# Patient Record
Sex: FEMALE | Race: WHITE | NOT HISPANIC OR LATINO | URBAN - METROPOLITAN AREA
[De-identification: names, ages, dates, MRNs, and addresses within clinical notes are randomized per-mention and may not be internally consistent; named-entity substitution may affect disease eponyms.]

---

## 2023-07-25 ENCOUNTER — TELEPHONE (OUTPATIENT)
Dept: PRIMARY CARE | Facility: CLINIC | Age: 18
End: 2023-07-25

## 2024-02-07 ENCOUNTER — LAB REQUISITION (OUTPATIENT)
Dept: LAB | Facility: HOSPITAL | Age: 19
End: 2024-02-07

## 2024-02-07 DIAGNOSIS — R30.0 DYSURIA: ICD-10-CM

## 2024-02-07 DIAGNOSIS — N89.8 OTHER SPECIFIED NONINFLAMMATORY DISORDERS OF VAGINA: ICD-10-CM

## 2024-02-07 PROCEDURE — 87102 FUNGUS ISOLATION CULTURE: CPT

## 2024-02-07 PROCEDURE — 87086 URINE CULTURE/COLONY COUNT: CPT

## 2024-02-07 PROCEDURE — 87800 DETECT AGNT MULT DNA DIREC: CPT

## 2024-02-08 ENCOUNTER — LAB REQUISITION (OUTPATIENT)
Dept: LAB | Facility: HOSPITAL | Age: 19
End: 2024-02-08

## 2024-02-08 DIAGNOSIS — N89.8 OTHER SPECIFIED NONINFLAMMATORY DISORDERS OF VAGINA: ICD-10-CM

## 2024-02-08 DIAGNOSIS — R30.0 DYSURIA: ICD-10-CM

## 2024-02-08 LAB
C TRACH RRNA SPEC QL NAA+PROBE: NEGATIVE
N GONORRHOEA DNA SPEC QL PROBE+SIG AMP: NEGATIVE

## 2024-02-09 LAB — BACTERIA UR CULT: NORMAL

## 2024-02-11 LAB — YEAST SPEC QL CULT: NORMAL

## 2025-01-13 ENCOUNTER — APPOINTMENT (OUTPATIENT)
Dept: URGENT CARE | Age: 20
End: 2025-01-13
Payer: COMMERCIAL

## 2025-01-13 ENCOUNTER — OFFICE VISIT (OUTPATIENT)
Dept: URGENT CARE | Age: 20
End: 2025-01-13
Payer: COMMERCIAL

## 2025-01-13 VITALS
WEIGHT: 110 LBS | HEART RATE: 80 BPM | HEIGHT: 64 IN | SYSTOLIC BLOOD PRESSURE: 120 MMHG | BODY MASS INDEX: 18.78 KG/M2 | DIASTOLIC BLOOD PRESSURE: 81 MMHG | OXYGEN SATURATION: 96 %

## 2025-01-13 DIAGNOSIS — N30.01 ACUTE CYSTITIS WITH HEMATURIA: Primary | ICD-10-CM

## 2025-01-13 LAB
POC APPEARANCE, URINE: CLEAR
POC BILIRUBIN, URINE: ABNORMAL
POC BLOOD, URINE: NEGATIVE
POC COLOR, URINE: ABNORMAL
POC GLUCOSE, URINE: ABNORMAL MG/DL
POC KETONES, URINE: ABNORMAL MG/DL
POC LEUKOCYTES, URINE: ABNORMAL
POC NITRITE,URINE: POSITIVE
POC PH, URINE: 7 PH
POC PROTEIN, URINE: NEGATIVE MG/DL
POC SPECIFIC GRAVITY, URINE: 1.01
POC UROBILINOGEN, URINE: 2 EU/DL
PREGNANCY TEST URINE, POC: NEGATIVE

## 2025-01-13 PROCEDURE — 3008F BODY MASS INDEX DOCD: CPT

## 2025-01-13 PROCEDURE — 87086 URINE CULTURE/COLONY COUNT: CPT

## 2025-01-13 PROCEDURE — 81003 URINALYSIS AUTO W/O SCOPE: CPT

## 2025-01-13 PROCEDURE — 1036F TOBACCO NON-USER: CPT

## 2025-01-13 PROCEDURE — 99203 OFFICE O/P NEW LOW 30 MIN: CPT

## 2025-01-13 PROCEDURE — 81025 URINE PREGNANCY TEST: CPT

## 2025-01-13 RX ORDER — PHENAZOPYRIDINE HYDROCHLORIDE 200 MG/1
200 TABLET, FILM COATED ORAL 3 TIMES DAILY PRN
Qty: 10 TABLET | Refills: 0 | Status: SHIPPED | OUTPATIENT
Start: 2025-01-13 | End: 2025-01-16

## 2025-01-13 RX ORDER — NITROFURANTOIN 25; 75 MG/1; MG/1
100 CAPSULE ORAL 2 TIMES DAILY
Qty: 10 CAPSULE | Refills: 0 | Status: SHIPPED | OUTPATIENT
Start: 2025-01-13 | End: 2025-01-18

## 2025-01-13 RX ORDER — NITROFURANTOIN 25; 75 MG/1; MG/1
100 CAPSULE ORAL 2 TIMES DAILY
Qty: 10 CAPSULE | Refills: 0 | Status: SHIPPED | OUTPATIENT
Start: 2025-01-13 | End: 2025-01-13

## 2025-01-13 RX ORDER — PHENAZOPYRIDINE HYDROCHLORIDE 200 MG/1
200 TABLET, FILM COATED ORAL 3 TIMES DAILY PRN
Qty: 10 TABLET | Refills: 0 | Status: SHIPPED | OUTPATIENT
Start: 2025-01-13 | End: 2025-01-13

## 2025-01-13 ASSESSMENT — ENCOUNTER SYMPTOMS
FREQUENCY: 1
DYSURIA: 1

## 2025-01-13 NOTE — PATIENT INSTRUCTIONS
You were seen at Urgent Care today and diagnosed with a urinary tract infection.  Please treat as discussed. Please take medications as prescribed. Monitor for red flags which we spoke about, If your symptoms change, worsen or become concerning in any way, please go to the emergency room immediately, otherwise you can followup with your PCP in 2-3 days as needed

## 2025-01-13 NOTE — PROGRESS NOTES
Subjective   Patient ID: Marlene De Jesus is a 19 y.o. female. They present today with a chief complaint of UTI (Frequent urination with fullness symptoms for 5 days).    History of Present Illness  Patient is a pleasant 19-year-old female with no relevant past medical history presents urgent care today with a complaint of urinary symptoms.  She states her symptoms are approximately 5 days ago.  Specifically she endorses urinary urgency/frequency/burning.  She has been using over-the-counter medication without significant relief.  Denies any abdominal pain, fevers, chills, nausea, vomiting or back pain.  No other complaints or concerns this time and      History provided by:  Patient  UTI      Past Medical History  Allergies as of 01/13/2025    (No Known Allergies)       (Not in a hospital admission)         Past Medical History:   Diagnosis Date    Body mass index (BMI) 20.0-20.9, adult 09/30/2021    BMI 20.0-20.9, adult    Body mass index (BMI) 20.0-20.9, adult 09/16/2021    BMI 20.0-20.9, adult    Body mass index (BMI) pediatric, 5th percentile to less than 85th percentile for age 01/14/2021    BMI (body mass index), pediatric, 5% to less than 85% for age    Cellulitis of face 10/18/2021    Cellulitis of cheek    Open bite of right upper arm, subsequent encounter 11/04/2021    Dog bite of right upper extremity, subsequent encounter    Other conditions influencing health status 01/21/2022    History of cough    Pelvic and perineal pain 07/15/2021    Vaginal pain    Personal history of other diseases of the female genital tract     History of vaginal discharge    Personal history of other diseases of the female genital tract     History of vaginitis    Rash and other nonspecific skin eruption 09/08/2021    Rash    Unspecified inflammation of eyelid 12/05/2021    Infected eye lid       No past surgical history on file.     reports that she has never smoked. She has never been exposed to tobacco smoke. She  "has never used smokeless tobacco. She reports current drug use. Drug: Marijuana. She reports that she does not drink alcohol.    Review of Systems  Review of Systems   Genitourinary:  Positive for dysuria, frequency and urgency.                                  Objective    Vitals:    01/13/25 1343   BP: 120/81   BP Location: Left arm   Patient Position: Sitting   BP Cuff Size: Adult   Pulse: 80   SpO2: 96%   Weight: 49.9 kg (110 lb)   Height: 1.626 m (5' 4\")     Patient's last menstrual period was 12/18/2024 (approximate).    Physical Exam  Vitals and nursing note reviewed.   Constitutional:       General: She is not in acute distress.     Appearance: Normal appearance. She is not ill-appearing, toxic-appearing or diaphoretic.   HENT:      Head: Normocephalic and atraumatic.      Mouth/Throat:      Mouth: Mucous membranes are moist.   Eyes:      Extraocular Movements: Extraocular movements intact.      Conjunctiva/sclera: Conjunctivae normal.      Pupils: Pupils are equal, round, and reactive to light.   Cardiovascular:      Rate and Rhythm: Normal rate and regular rhythm.      Pulses: Normal pulses.      Heart sounds: Normal heart sounds.   Pulmonary:      Effort: Pulmonary effort is normal. No respiratory distress.      Breath sounds: Normal breath sounds. No stridor. No wheezing, rhonchi or rales.   Chest:      Chest wall: No tenderness.   Abdominal:      General: Abdomen is flat.      Palpations: Abdomen is soft.      Tenderness: There is no right CVA tenderness or left CVA tenderness.   Musculoskeletal:         General: Normal range of motion.      Cervical back: Normal range of motion and neck supple.   Skin:     General: Skin is warm and dry.      Capillary Refill: Capillary refill takes less than 2 seconds.   Neurological:      General: No focal deficit present.      Mental Status: She is alert and oriented to person, place, and time.   Psychiatric:         Mood and Affect: Mood normal.         Behavior: " Behavior normal.         Procedures      Assessment/Plan   Allergies, medications, history, and pertinent labs/EKGs/Imaging reviewed by ALISSA Bray.     Medical Decision Making  Patient is well appearing, afebrile, non toxic, not hypoxic, and appropriate for outpatient treatment and management at time of evaluation.     Patient presents with urinary symptoms as described above.     Differential includes but not limited to: Urinary tract infection, pyelonephritis, nephrolithiasis, other.      Pregnancy is negative.  Urinalysis positive for signs of infection and hematuria. Antibiotics   Called into patient's pharmacy to be used as directed.  Discussed differential with the patient. Patient voices understanding and is agreeable to close follow-up with their PCP in the next 2-3 days.  Patient states she does not currently have a PCP.  Order was placed on her behalf.  They understand they should go to the emergency room immediately for any new, worsening or concerning symptoms. Patient understands return precautions and discharge instructions.        Orders and Diagnoses  Diagnoses and all orders for this visit:  Acute cystitis with hematuria  -     Urine Culture  -     nitrofurantoin, macrocrystal-monohydrate, (Macrobid) 100 mg capsule; Take 1 capsule (100 mg) by mouth 2 times a day for 5 days.  -     phenazopyridine (Pyridium) 200 mg tablet; Take 1 tablet (200 mg) by mouth 3 times a day as needed for bladder spasms for up to 3 days.  -     Referral to Primary Care; Future      Medical Admin Record      Follow Up Instructions  No follow-ups on file.    Patient disposition: Home    Electronically signed by ALISSA Bray  2:01 PM

## 2025-01-14 LAB — BACTERIA UR CULT: NO GROWTH

## 2025-01-28 ENCOUNTER — OFFICE VISIT (OUTPATIENT)
Dept: URGENT CARE | Age: 20
End: 2025-01-28
Payer: COMMERCIAL

## 2025-01-28 VITALS
BODY MASS INDEX: 18.88 KG/M2 | TEMPERATURE: 98.2 F | OXYGEN SATURATION: 95 % | WEIGHT: 110 LBS | SYSTOLIC BLOOD PRESSURE: 108 MMHG | DIASTOLIC BLOOD PRESSURE: 71 MMHG | HEART RATE: 97 BPM | RESPIRATION RATE: 16 BRPM

## 2025-01-28 DIAGNOSIS — B00.9 HSV (HERPES SIMPLEX VIRUS) INFECTION: ICD-10-CM

## 2025-01-28 DIAGNOSIS — Z76.89 ESTABLISHING CARE WITH NEW DOCTOR, ENCOUNTER FOR: Primary | ICD-10-CM

## 2025-01-28 DIAGNOSIS — B37.31 ACUTE CANDIDIASIS OF VULVA AND VAGINA: ICD-10-CM

## 2025-01-28 DIAGNOSIS — R30.0 DYSURIA: ICD-10-CM

## 2025-01-28 DIAGNOSIS — N89.8 VAGINAL ITCHING: ICD-10-CM

## 2025-01-28 DIAGNOSIS — Z87.440 HISTORY OF UTI: ICD-10-CM

## 2025-01-28 LAB
POC APPEARANCE, URINE: ABNORMAL
POC BACTERIAL VAGINITIS (RAPID): NEGATIVE
POC BILIRUBIN, URINE: NEGATIVE
POC BLOOD, URINE: ABNORMAL
POC COLOR, URINE: YELLOW
POC GLUCOSE, URINE: NEGATIVE MG/DL
POC KETONES, URINE: NEGATIVE MG/DL
POC LEUKOCYTES, URINE: NEGATIVE
POC NITRITE,URINE: NEGATIVE
POC PH, URINE: 7 PH
POC PROTEIN, URINE: NEGATIVE MG/DL
POC SPECIFIC GRAVITY, URINE: 1.01
POC UROBILINOGEN, URINE: 0.2 EU/DL
PREGNANCY TEST URINE, POC: NEGATIVE

## 2025-01-28 RX ORDER — ACYCLOVIR 400 MG/1
400 TABLET ORAL 4 TIMES DAILY
Qty: 50 TABLET | Refills: 2 | Status: SHIPPED | OUTPATIENT
Start: 2025-01-28 | End: 2025-02-04

## 2025-01-28 RX ORDER — FLUCONAZOLE 150 MG/1
150 TABLET ORAL ONCE
Qty: 1 TABLET | Refills: 0 | Status: SHIPPED | OUTPATIENT
Start: 2025-01-28 | End: 2025-01-28

## 2025-01-28 NOTE — PROGRESS NOTES
Subjective   Patient ID: Marlene De Jesus is a 19 y.o. female. They present today with a chief complaint of Urinary Problem (Pt requesting bv yeast and uti testing - ongoing sx from past visit ).    History of Present Illness  HPI    Past Medical History  Allergies as of 01/28/2025    (No Known Allergies)       (Not in a hospital admission)       Past Medical History:   Diagnosis Date    Body mass index (BMI) 20.0-20.9, adult 09/30/2021    BMI 20.0-20.9, adult    Body mass index (BMI) 20.0-20.9, adult 09/16/2021    BMI 20.0-20.9, adult    Body mass index (BMI) pediatric, 5th percentile to less than 85th percentile for age 01/14/2021    BMI (body mass index), pediatric, 5% to less than 85% for age    Cellulitis of face 10/18/2021    Cellulitis of cheek    Open bite of right upper arm, subsequent encounter 11/04/2021    Dog bite of right upper extremity, subsequent encounter    Other conditions influencing health status 01/21/2022    History of cough    Pelvic and perineal pain 07/15/2021    Vaginal pain    Personal history of other diseases of the female genital tract     History of vaginal discharge    Personal history of other diseases of the female genital tract     History of vaginitis    Rash and other nonspecific skin eruption 09/08/2021    Rash    Unspecified inflammation of eyelid 12/05/2021    Infected eye lid       History reviewed. No pertinent surgical history.     reports that she has never smoked. She has never been exposed to tobacco smoke. She has never used smokeless tobacco. She reports current drug use. Drug: Marijuana. She reports that she does not drink alcohol.    Review of Systems  Review of Systems   Genitourinary:  Positive for urgency and vaginal discharge.        HSV outbreak per pt, needs refill   All other systems reviewed and are negative.                                 Objective    Vitals:    01/28/25 1538   BP: 108/71   Pulse: 97   Resp: 16   Temp: 36.8 °C (98.2 °F)   SpO2:  95%   Weight: 49.9 kg (110 lb)     Patient's last menstrual period was 01/28/2025 (approximate).    Physical Exam  Vitals and nursing note reviewed.   Constitutional:       Appearance: Normal appearance.   HENT:      Head: Normocephalic.      Right Ear: Tympanic membrane normal.      Left Ear: Tympanic membrane normal.      Nose: Nose normal.      Mouth/Throat:      Mouth: Mucous membranes are dry.      Pharynx: Oropharynx is clear.   Eyes:      Extraocular Movements: Extraocular movements intact.      Pupils: Pupils are equal, round, and reactive to light.   Cardiovascular:      Rate and Rhythm: Normal rate and regular rhythm.      Pulses: Normal pulses.      Heart sounds: Normal heart sounds.   Pulmonary:      Effort: Pulmonary effort is normal.      Breath sounds: Normal breath sounds.   Abdominal:      General: There is no distension.      Tenderness: There is no abdominal tenderness. There is no right CVA tenderness or left CVA tenderness.   Musculoskeletal:         General: Normal range of motion.      Cervical back: Normal range of motion and neck supple.   Lymphadenopathy:      Cervical: No cervical adenopathy.   Skin:     General: Skin is warm and dry.   Neurological:      General: No focal deficit present.      Mental Status: She is alert and oriented to person, place, and time.   Psychiatric:         Mood and Affect: Mood normal.         Behavior: Behavior normal.         Procedures    Point of Care Test & Imaging Results from this visit  Results for orders placed or performed in visit on 01/28/25   POCT BV Blue Rapid - Bacterial Vaginitis manually resulted   Result Value Ref Range    POC Bacterial Vaginitis (Rapid) Negative Negative   POCT UA Automated manually resulted   Result Value Ref Range    POC Color, Urine Yellow Straw, Yellow, Light-Yellow    POC Appearance, Urine Cloudy (A) Clear    POC Glucose, Urine NEGATIVE NEGATIVE mg/dl    POC Bilirubin, Urine NEGATIVE NEGATIVE    POC Ketones, Urine  NEGATIVE NEGATIVE mg/dl    POC Specific Gravity, Urine 1.015 1.005 - 1.035    POC Blood, Urine LARGE (3+) (A) NEGATIVE    POC PH, Urine 7.0 No Reference Range Established PH    POC Protein, Urine NEGATIVE NEGATIVE mg/dl    POC Urobilinogen, Urine 0.2 0.2, 1.0 EU/DL    Poc Nitrite, Urine NEGATIVE NEGATIVE    POC Leukocytes, Urine NEGATIVE NEGATIVE   POCT pregnancy, urine manually resulted   Result Value Ref Range    Preg Test, Ur Negative Negative      No results found.    Diagnostic study results (if any) were reviewed by ALISSA Pete.    Assessment/Plan   Allergies, medications, history, and pertinent labs/EKGs/Imaging reviewed by ALISSA Pete.     Medical Decision Making    Patient is a pleasant 19-year-old female with no relevant past medical history presents urgent care today with a complaint of urinary symptoms. Pt was treated here for UTI with s/s of urinary urgency/frequency/burning with macrobid. Pt's urine culture was negative last visit, so other differential diagnosis need to be ruled out, in which education provided to pt. Currently, she is homeless and has multiple complaints in which appropriate evaluation and assessment of PCP is required, as well as social work referral but unable to do so so behavioral health referral placed. Pt declines STD testing at this time even with recommendations. Denies any abdominal pain, fevers, chills, nausea, vomiting or back pain. No other complaints or concerns this time and   Pt requesting HSV medication since she is having an outbreak, declined exam  Pt states she knows her s/s are r/t her HSV outbreak, so education provided and pt needs PCP f/u, so PCP referral placed  Declined STD testing with medical recommendation to rule out, but pt will if medication will not relieve s    HCG- NEG  UA-+trace leuks  Rapid BV- NEG  Urine culture- PENDING  Referral to PCP and social work d/t current situation  Pt discussed current life status, so  advised pt to f/u with referrals and if she does not feel safe to communicate so, pt states she is safe but having a hard time affording food/materials/health care  Education provided on f/u and what safe living is      Take all the medicine you were prescribed, even if you feel better. Not finishing the medicine can make the infection come back. It may also make a future infection harder to treat.  Unless told not to by your healthcare provider, drink 8 to 12 glasses of fluid every day. Clear fluids, such as water, are best. This may help flush the infection from your system.  Please consider D-mannose supplementation if you continue to get recurrent UTIs as it may help prevent against gram negative bacteria such as E. coli  Preventing future infection  Keep your genital area clean. Use mild soap. Rinse with water.  If you are a woman, always wipe the genital area from front to back.  Urinate frequently. Don't hold urine in your bladder for a long time.  Always urinate after having sex.  Practice safe sex. Protect yourself and your partner from sexually transmitted infections (STIs).    Follow-up care  Follow up with your healthcare provider, or as advised. And see your healthcare provider for regular lab tests as directed.      Call 911 or go to ER if you have    Decreased urine output or trouble urinating  Severe pain in the lower back or flank  Fever of 100.4°F (38°C) or higher, or as directed by your healthcare provider  Shaking chills  Vomiting  Blood in your urine  Dark-colored or foul-smelling urine  Nausea or other problems that prevent you from taking your prescribed medicine  New or worsening symptoms      Orders and Diagnoses  Diagnoses and all orders for this visit:  Establishing care with new doctor, encounter for  -     Referral to Primary Care; Future  -     Referral to [facility]; Future  -     POCT BV Blue Rapid - Bacterial Vaginitis manually resulted  -     POCT UA Automated  manually resulted  -     POCT pregnancy, urine manually resulted  -     Referral to [facility]  Dysuria  -     Urine Culture  History of UTI  -     Urine Culture  HSV (herpes simplex virus) infection  -     Referral to [facility]  -     acyclovir (Zovirax) 400 mg tablet; Take 1 tablet (400 mg) by mouth 4 times a day for 7 days.  Vaginal itching  -     Vaginitis Gram Stain For Bacterial Vaginosis + Yeast  Acute candidiasis of vulva and vagina  -     fluconazole (Diflucan) 150 mg tablet; Take 1 tablet (150 mg) by mouth 1 time for 1 dose.      Medical Admin Record      Patient disposition: Home    Electronically signed by ALISSA Pete  4:27 PM

## 2025-01-29 ENCOUNTER — PATIENT OUTREACH (OUTPATIENT)
Dept: CARE COORDINATION | Facility: CLINIC | Age: 20
End: 2025-01-29
Payer: COMMERCIAL

## 2025-01-29 LAB
BV SCORE VAG QL: ABNORMAL
YEAST SPEC QL CULT: ABNORMAL

## 2025-01-29 NOTE — PROGRESS NOTES
Referred to this  for:   Population Health Program:  Care Manager  What is the patient's priority area of need? Housing  What is the patient's priority area of need? Food  What is the patient's priority area of need? Social support  What is the patient's priority area of need? Transportation  What is the patient's priority area of need? Alcohol and drugs  What is the patient's priority area of need? Safety concern  What is the patient's priority area of need? Dental health  What is the patient's priority area of need? Material needs  Did the patient indicate any other needs? anxiety and stress unable to manage appropriotly       This  called patient and left message explaining my role.  NOTE: Pt may be referred by this  to community health worker for some of the above needs mentioned in the referral.     Ita Quiroga MSW LSW

## 2025-01-30 LAB — BACTERIA UR CULT: NORMAL

## 2025-02-01 LAB
BV SCORE VAG QL: ABNORMAL
YEAST SPEC QL CULT: ABNORMAL

## 2025-02-03 ENCOUNTER — PATIENT OUTREACH (OUTPATIENT)
Dept: CARE COORDINATION | Facility: CLINIC | Age: 20
End: 2025-02-03
Payer: COMMERCIAL

## 2025-02-03 NOTE — PROGRESS NOTES
2nd attempt:  This SW has left message for this patient to call for assistance for community resources due to referral sent to [facility] SW team.   2nd attempt to reach.     Ita Quiroga MSW LSW   [phone].    NOTE: Pt has this 's info if patient wants and or needs assistance with resources.

## 2025-05-02 ENCOUNTER — OFFICE VISIT (OUTPATIENT)
Dept: URGENT CARE | Age: 20
End: 2025-05-02
Payer: COMMERCIAL

## 2025-05-02 VITALS — TEMPERATURE: 98 F | OXYGEN SATURATION: 94 % | HEART RATE: 90 BPM

## 2025-05-02 DIAGNOSIS — J01.00 ACUTE MAXILLARY SINUSITIS, RECURRENCE NOT SPECIFIED: Primary | ICD-10-CM

## 2025-05-02 RX ORDER — ALBUTEROL SULFATE 90 UG/1
2 INHALANT RESPIRATORY (INHALATION) EVERY 6 HOURS PRN
Qty: 18 G | Refills: 0 | Status: SHIPPED | OUTPATIENT
Start: 2025-05-02 | End: 2026-05-02

## 2025-05-02 RX ORDER — GUAIFENESIN 1200 MG/1
1200 TABLET, EXTENDED RELEASE ORAL EVERY 12 HOURS PRN
Qty: 14 TABLET | Refills: 0 | Status: SHIPPED | OUTPATIENT
Start: 2025-05-02 | End: 2025-05-09

## 2025-05-02 RX ORDER — BENZONATATE 200 MG/1
200 CAPSULE ORAL 3 TIMES DAILY PRN
Qty: 30 CAPSULE | Refills: 0 | Status: SHIPPED | OUTPATIENT
Start: 2025-05-02 | End: 2025-05-09

## 2025-05-02 ASSESSMENT — ENCOUNTER SYMPTOMS: COUGH: 1

## 2025-05-02 NOTE — LETTER
May 2, 2025     Patient: Marlene De Jesus   YOB: 2005   Date of Visit: 5/2/2025       To Whom It May Concern:    Marlene De Jesus was seen in my clinic on 5/2/2025 at 12:30 pm. Please excuse Marlene for her absence from work on this day to make the appointment and may return to work on 5/5/25, thank you.    If you have any questions or concerns, please don't hesitate to call.         Sincerely,         Meghan Rodriguez, LENCHO-CNP        CC: No Recipients

## 2025-05-02 NOTE — PROGRESS NOTES
Subjective   Patient ID: Marlene De Jesus is a 20 y.o. female. They present today with a chief complaint of Cough and Nausea (5 Days).    History of Present Illness  HPI    Past Medical History  Allergies as of 05/02/2025   • (No Known Allergies)       Prescriptions Prior to Admission[1]     Medical History[2]    Surgical History[3]     reports that she has never smoked. She has never been exposed to tobacco smoke. She has never used smokeless tobacco. She reports current drug use. Drug: Marijuana. She reports that she does not drink alcohol.    Review of Systems  Review of Systems   Respiratory:  Positive for cough.    All other systems reviewed and are negative.                                 Objective    Vitals:    05/02/25 1255   Pulse: 90   Temp: 36.7 °C (98 °F)   SpO2: 94%     No LMP recorded.    Physical Exam  Vitals and nursing note reviewed.   Constitutional:       Appearance: Normal appearance.   HENT:      Head: Normocephalic.      Right Ear: Tympanic membrane normal.      Left Ear: Tympanic membrane normal.      Nose: Congestion and rhinorrhea present.      Mouth/Throat:      Mouth: Mucous membranes are dry.      Pharynx: Oropharynx is clear. Postnasal drip present.   Eyes:      Extraocular Movements: Extraocular movements intact.      Pupils: Pupils are equal, round, and reactive to light.   Cardiovascular:      Rate and Rhythm: Normal rate and regular rhythm.      Pulses: Normal pulses.      Heart sounds: Normal heart sounds.   Pulmonary:      Effort: Pulmonary effort is normal.      Breath sounds: Normal breath sounds.   Musculoskeletal:         General: Normal range of motion.      Cervical back: Normal range of motion and neck supple.   Lymphadenopathy:      Cervical: Cervical adenopathy present.   Skin:     General: Skin is warm and dry.   Neurological:      General: No focal deficit present.      Mental Status: She is alert and oriented to person, place, and time.   Psychiatric:          Mood and Affect: Mood normal.         Behavior: Behavior normal.       Procedures    Point of Care Test & Imaging Results from this visit  No results found for this visit on 05/02/25.   Imaging  No results found.    Cardiology, Vascular, and Other Imaging  No other imaging results found for the past 2 days      Diagnostic study results (if any) were reviewed by ALISSA Pete.    Assessment/Plan   Allergies, medications, history, and pertinent labs/EKGs/Imaging reviewed by ALISSA Pete.     Medical Decision Making      Testing declined  Per PE, suspect viral sinusitis d/t clinical feature s/s progression and duration including benign PE so advised to implement normal saline mist spray, flonase daily, and if s/s persist  f/u with PCP    Work note provided   If ear involvement, refrain from swimming and flying until f/u with PCP  Normal saline mist spray 3 times a day to clear out sinuses and reduce PND  Advised to take daily anti-histamines   alternate Tylenol and Motrin PRN for discomfort  gargle with warm water and salt  f/u PCP 2-3 days    Follow up Care: Pt instructed to follow-up with PCP or other appropriate clinician within 24 to 48 hours. Report to ED if there is any development in worsening pain, difficulty swallowing, change in phonation, fever, chills, neck pain, photophobia, headache, neck stiffness, chest pain, abdominal pain, vomiting, syncope, hemoptysis, leg swelling SOB, fever, facial swelling, eye pain, periorbital swelling/erythema, or any new signs or sx.     The patient was educated regarding diagnosis, supportive care, OTC and Rx medications. The patient was given the opportunity to ask questions prior to discharge. They verbalized understanding of my discussion of the plans for treatment, expected course, indications to return to  or seek further evaluation in ED, and the need for timely follow up as directed.       Most of the time, sinusitis does NOT need to be  treated with antibiotics. This is because most sinusitis is caused by viruses - not bacteria - and antibiotics do not kill viruses. Many people get over sinus infections without antibiotics.  Some people with sinusitis do need treatment with antibiotics. If your symptoms have not improved after 10 - 14 days, please see your Primary Care Physician. Your doctor might recommend that you wait 1 more week to see if your symptoms improve. But if you have symptoms such as a fever, he or she might prescribe antibiotics. It is important to follow your doctor's instructions about taking your antibiotics.    Sinusitis is a condition that can cause a stuffy nose, pain in the face, and discharge (mucus) from the nose. The sinuses are hollow areas in the bones of the face. They have a thin lining that normally makes a small amount of mucus. When this lining gets irritated or infected, it swells and makes extra mucus. This causes symptoms.  Sinusitis can occur when a person gets sick with a cold. The germs causing the cold can also infect the sinuses. Many times, a person feels like his or her cold is getting better. But then he or she gets sinusitis and begins to feel sick again.    Common symptoms of sinusitis include:  -Stuffy or blocked nose  -Thick white, yellow, or green discharge from the no  -Pain in the teeth  -Pain or pressure in the face - This often feels worse when a person bends forward.    People with sinusitis can also have other symptoms that include:  -Fever  -Cough  -Trouble smelling  -Ear pressure or fullness  -Headache  -Bad breath  -Feeling tired    Most of the time, symptoms start to improve in 7 to 10 days.    To reduce your symptoms    Discharge Home Care:    Take medication as prescribed and incorporate probiotics for gut health  normal saline mist spray three times and day and flonase daily  Increase fluids, to help thin congestion. You might use a cool mist humidifier/vaporizer in your room if the air  is dry. This will help thin congestion and keep your sinus moist.  Sleeping in a more upright position can be helpful.  Using saline nose drops or mists can also help thick sinus congestion drain. Apply the mist or drops, then tip your head back and rotate from side to side to help    You can use acetaminophen (paracetamol, Tylenol) or ibuprofen (Motrin) for fever or headache. Drink warm teas with honey.  If you do not improve or you begin to worsen please follow up with your primary care physician.        PATIENT INSTRUCTIONS:    HOME CARE INSTRUCTIONS:  --- Drink plenty of water. Water helps thin the mucus so your sinuses can drain more easily.  --- Use a humidifier.  --- Inhale steam 3 to 4 times a day (for example, sit in the bathroom with the shower running).  --- Apply a warm, moist washcloth to your face 3 to 4 times a day, or as directed by your caregiver.  --- Take over-the-counter or prescription medicines for pain, discomfort, or fever only as directed by your caregiver.    SEEK FURTHER TREATMENT IF YOU:  --- You have increasing pain or severe headaches.  --- You have nausea, vomiting, or drowsiness.  --- You have swelling around your face.  --- You have vision problems.  --- You have a stiff neck.  --- You have difficulty breathing.      Orders and Diagnoses  Diagnoses and all orders for this visit:  Acute maxillary sinusitis, recurrence not specified  -     guaiFENesin (Mucinex) 1,200 mg tablet extended release 12hr; Take 1 tablet (1,200 mg) by mouth every 12 hours if needed (cough) for up to 7 days.  -     albuterol 90 mcg/actuation inhaler; Inhale 2 puffs every 6 hours if needed for wheezing.  -     benzonatate (Tessalon) 200 mg capsule; Take 1 capsule (200 mg) by mouth 3 times a day as needed for cough for up to 7 days. Do not crush or chew.      Medical Admin Record      Patient disposition: Home    Electronically signed by ALISSA Pete  1:52 PM             [1]  (Not in a hospital  admission)   [2]  Past Medical History:  Diagnosis Date   • Body mass index (BMI) 20.0-20.9, adult 09/30/2021    BMI 20.0-20.9, adult   • Body mass index (BMI) 20.0-20.9, adult 09/16/2021    BMI 20.0-20.9, adult   • Body mass index (BMI) pediatric, 5th percentile to less than 85th percentile for age 01/14/2021    BMI (body mass index), pediatric, 5% to less than 85% for age   • Cellulitis of face 10/18/2021    Cellulitis of cheek   • Open bite of right upper arm, subsequent encounter 11/04/2021    Dog bite of right upper extremity, subsequent encounter   • Other conditions influencing health status 01/21/2022    History of cough   • Pelvic and perineal pain 07/15/2021    Vaginal pain   • Personal history of other diseases of the female genital tract     History of vaginal discharge   • Personal history of other diseases of the female genital tract     History of vaginitis   • Rash and other nonspecific skin eruption 09/08/2021    Rash   • Unspecified inflammation of eyelid 12/05/2021    Infected eye lid   [3]  History reviewed. No pertinent surgical history.